# Patient Record
Sex: FEMALE
[De-identification: names, ages, dates, MRNs, and addresses within clinical notes are randomized per-mention and may not be internally consistent; named-entity substitution may affect disease eponyms.]

---

## 2023-04-20 ENCOUNTER — TELEPHONE (OUTPATIENT)
Dept: INTERNAL MEDICINE CLINIC | Facility: CLINIC | Age: 73
End: 2023-04-20

## 2023-04-20 NOTE — TELEPHONE ENCOUNTER
Pt's daughter called, did not get name, wanting to discuss Pt. Daughter informed Pt has not been seen at clinic and no medical records on file and writer unable to give any medical advise. Daughter requested an appointment for today. Call disconnected after daughter informed no avail appts. for today.